# Patient Record
Sex: MALE | Race: WHITE | Employment: OTHER | URBAN - METROPOLITAN AREA
[De-identification: names, ages, dates, MRNs, and addresses within clinical notes are randomized per-mention and may not be internally consistent; named-entity substitution may affect disease eponyms.]

---

## 2017-06-16 ENCOUNTER — APPOINTMENT (OUTPATIENT)
Dept: GENERAL RADIOLOGY | Age: 82
End: 2017-06-16
Attending: EMERGENCY MEDICINE
Payer: MEDICARE

## 2017-06-16 ENCOUNTER — HOSPITAL ENCOUNTER (EMERGENCY)
Age: 82
Discharge: HOME | End: 2017-06-16
Attending: EMERGENCY MEDICINE
Payer: MEDICARE

## 2017-06-16 VITALS
HEIGHT: 72 IN | RESPIRATION RATE: 18 BRPM | OXYGEN SATURATION: 98 % | TEMPERATURE: 98.6 F | WEIGHT: 170 LBS | SYSTOLIC BLOOD PRESSURE: 158 MMHG | DIASTOLIC BLOOD PRESSURE: 84 MMHG | HEART RATE: 60 BPM | BODY MASS INDEX: 23.03 KG/M2

## 2017-06-16 DIAGNOSIS — S92.422A: Primary | ICD-10-CM

## 2017-06-16 PROCEDURE — 99283 EMERGENCY DEPT VISIT LOW MDM: CPT | Performed by: EMERGENCY MEDICINE

## 2017-06-16 PROCEDURE — 75810000293 HC SIMP/SUPERF WND  RPR: Performed by: EMERGENCY MEDICINE

## 2017-06-16 PROCEDURE — 77030010507 HC ADH SKN DERMBND J&J -B

## 2017-06-16 PROCEDURE — 73630 X-RAY EXAM OF FOOT: CPT

## 2017-06-16 RX ORDER — CEPHALEXIN 500 MG/1
500 CAPSULE ORAL 3 TIMES DAILY
Qty: 15 CAP | Refills: 0 | Status: SHIPPED | OUTPATIENT
Start: 2017-06-16 | End: 2017-06-21

## 2017-06-16 RX ORDER — HYDROCODONE BITARTRATE AND ACETAMINOPHEN 5; 325 MG/1; MG/1
1 TABLET ORAL
Qty: 8 TAB | Refills: 0 | Status: SHIPPED | OUTPATIENT
Start: 2017-06-16

## 2017-06-16 NOTE — ED PROVIDER NOTES
HPI Comments: Carrying a dresser and he dropped it several inches onto hisleft great and second toe. Some bleeding adjacent to this. Up-to-date on tetanus. No other injury. Patient is a 80 y.o. male presenting with foot injury. The history is provided by the patient and a relative. Foot Injury    This is a new problem. The problem occurs constantly. The problem has not changed since onset. The pain is present in the left toes. The pain is at a severity of 4/10. Pertinent negatives include no numbness, no stiffness and no tingling. The symptoms are aggravated by palpation. He has tried nothing for the symptoms. There has been a history of trauma. No past medical history on file. No past surgical history on file. No family history on file. Social History     Social History    Marital status:      Spouse name: N/A    Number of children: N/A    Years of education: N/A     Occupational History    Not on file. Social History Main Topics    Smoking status: Not on file    Smokeless tobacco: Not on file    Alcohol use Not on file    Drug use: Not on file    Sexual activity: Not on file     Other Topics Concern    Not on file     Social History Narrative         ALLERGIES: Review of patient's allergies indicates no known allergies. Review of Systems   Musculoskeletal: Negative for stiffness. Skin: Positive for wound. Neurological: Negative for tingling and numbness. All other systems reviewed and are negative. Vitals:    06/16/17 1059   BP: 158/84   Pulse: 60   Resp: 18   Temp: 98.6 °F (37 °C)   SpO2: 98%   Weight: 77.1 kg (170 lb)   Height: 6' (1.829 m)            Physical Exam   Constitutional: He appears well-developed and well-nourished. No distress. HENT:   Head: Atraumatic. Eyes: No scleral icterus. Neck: Neck supple. Cardiovascular: Normal rate. Pulmonary/Chest: Effort normal. No respiratory distress. Musculoskeletal: He exhibits tenderness.  He exhibits no deformity. Left foot: There is laceration. There is normal capillary refill. Feet:    Neurological: He is alert. He exhibits normal muscle tone. Skin: Skin is warm and dry. Psychiatric: His behavior is normal. Thought content normal.   Nursing note and vitals reviewed. MDM  Number of Diagnoses or Management Options  Closed fracture of distal phalanx of left great toe, physeal involvement unspecified, initial encounter:   Diagnosis management comments: fx of great and second toe  Small skin tears and do not feel this is open fx       Amount and/or Complexity of Data Reviewed  Tests in the radiology section of CPT®: reviewed and ordered  Obtain history from someone other than the patient: yes  Independent visualization of images, tracings, or specimens: yes    Risk of Complications, Morbidity, and/or Mortality  Presenting problems: moderate  Diagnostic procedures: low  Management options: moderate    Patient Progress  Patient progress: stable    ED Course       Wound Closure by Adhesive  Date/Time: 6/16/2017 12:31 PM  Performed by: Ardie Spurling  Authorized by: Ardie Spurling     Consent:     Consent obtained:  Verbal    Consent given by:  Patient    Risks discussed:  Infection    Alternatives discussed:  No treatment  Anesthesia (see MAR for exact dosages): Anesthesia method:  None  Laceration details:     Location:  Toe    Toe location:  L big toe    Length (cm):  0.4 (x 2)  Repair type:     Repair type:  Simple  Exploration:     Hemostasis achieved with:  Direct pressure    Contaminated: no    Treatment:     Area cleansed with:  Shchantal-Clens    Amount of cleaning:  Standard    Foreign body removal: nno foreign body.     Skin repair:     Repair method:  Tissue adhesive  Approximation:     Approximation:  Close    Vermilion border: well-aligned    Post-procedure details:     Dressing:  Sterile dressing

## 2017-06-16 NOTE — ED TRIAGE NOTES
Patient arrives to the ER via POV. Patient states earlier today he dropped a cabinet on his left foot from one foot above ground. Patient states he had his tetanus was last month.

## 2017-07-07 ENCOUNTER — HOSPITAL ENCOUNTER (EMERGENCY)
Age: 82
Discharge: HOME | End: 2017-07-07
Attending: EMERGENCY MEDICINE
Payer: MEDICARE

## 2017-07-07 ENCOUNTER — APPOINTMENT (OUTPATIENT)
Dept: GENERAL RADIOLOGY | Age: 82
End: 2017-07-07
Attending: EMERGENCY MEDICINE
Payer: MEDICARE

## 2017-07-07 VITALS
HEART RATE: 65 BPM | BODY MASS INDEX: 23.7 KG/M2 | DIASTOLIC BLOOD PRESSURE: 83 MMHG | SYSTOLIC BLOOD PRESSURE: 141 MMHG | HEIGHT: 72 IN | RESPIRATION RATE: 18 BRPM | WEIGHT: 175 LBS | OXYGEN SATURATION: 100 % | TEMPERATURE: 97.7 F

## 2017-07-07 DIAGNOSIS — L03.032 CELLULITIS OF GREAT TOE, LEFT: Primary | ICD-10-CM

## 2017-07-07 LAB
BASOPHILS # BLD AUTO: 0 K/UL (ref 0–0.2)
BASOPHILS # BLD: 1 % (ref 0–2)
CRP SERPL-MCNC: <0.3 MG/DL (ref 0–0.9)
DIFFERENTIAL METHOD BLD: ABNORMAL
EOSINOPHIL # BLD: 0.6 K/UL (ref 0–0.8)
EOSINOPHIL NFR BLD: 7 % (ref 0.5–7.8)
ERYTHROCYTE [DISTWIDTH] IN BLOOD BY AUTOMATED COUNT: 13.2 % (ref 11.9–14.6)
ERYTHROCYTE [SEDIMENTATION RATE] IN BLOOD: 17 MM/HR (ref 0–20)
HCT VFR BLD AUTO: 44 % (ref 41.1–50.3)
HGB BLD-MCNC: 15.6 G/DL (ref 13.6–17.2)
IMM GRANULOCYTES # BLD: 0 K/UL (ref 0–0.5)
IMM GRANULOCYTES NFR BLD AUTO: 0.1 % (ref 0–5)
LYMPHOCYTES # BLD AUTO: 27 % (ref 13–44)
LYMPHOCYTES # BLD: 2.3 K/UL (ref 0.5–4.6)
MCH RBC QN AUTO: 31.5 PG (ref 26.1–32.9)
MCHC RBC AUTO-ENTMCNC: 35.5 G/DL (ref 31.4–35)
MCV RBC AUTO: 88.7 FL (ref 79.6–97.8)
MONOCYTES # BLD: 0.5 K/UL (ref 0.1–1.3)
MONOCYTES NFR BLD AUTO: 6 % (ref 4–12)
NEUTS SEG # BLD: 5 K/UL (ref 1.7–8.2)
NEUTS SEG NFR BLD AUTO: 59 % (ref 43–78)
PLATELET # BLD AUTO: 271 K/UL (ref 150–450)
PMV BLD AUTO: 10 FL (ref 10.8–14.1)
RBC # BLD AUTO: 4.96 M/UL (ref 4.23–5.67)
WBC # BLD AUTO: 8.3 K/UL (ref 4.3–11.1)

## 2017-07-07 PROCEDURE — 85652 RBC SED RATE AUTOMATED: CPT | Performed by: EMERGENCY MEDICINE

## 2017-07-07 PROCEDURE — 73660 X-RAY EXAM OF TOE(S): CPT

## 2017-07-07 PROCEDURE — 99283 EMERGENCY DEPT VISIT LOW MDM: CPT | Performed by: EMERGENCY MEDICINE

## 2017-07-07 PROCEDURE — 85025 COMPLETE CBC W/AUTO DIFF WBC: CPT | Performed by: EMERGENCY MEDICINE

## 2017-07-07 PROCEDURE — 86140 C-REACTIVE PROTEIN: CPT | Performed by: EMERGENCY MEDICINE

## 2017-07-07 RX ORDER — SULFAMETHOXAZOLE AND TRIMETHOPRIM 800; 160 MG/1; MG/1
1 TABLET ORAL 2 TIMES DAILY
Qty: 14 TAB | Refills: 0 | Status: SHIPPED | OUTPATIENT
Start: 2017-07-07

## 2017-07-07 NOTE — ED PROVIDER NOTES
HPI Comments: 80-year-old gentleman suffered fractures of left great and second toe 2 weeks ago. Had a laceration of his left toe  At that time. Was feeling well up until yesterday when he noticed redness swelling and drainage. No fever or chills. Patient is a 80 y.o. male presenting with toe pain. The history is provided by the patient. Toe Pain    This is a new problem. The current episode started yesterday. The problem occurs constantly. The problem has not changed since onset. The pain is present in the left toes. The quality of the pain is described as pounding. The pain is mild. Associated symptoms include limited range of motion. Pertinent negatives include no numbness. No past medical history on file. No past surgical history on file. No family history on file. Social History     Social History    Marital status:      Spouse name: N/A    Number of children: N/A    Years of education: N/A     Occupational History    Not on file. Social History Main Topics    Smoking status: Not on file    Smokeless tobacco: Not on file    Alcohol use Not on file    Drug use: Not on file    Sexual activity: Not on file     Other Topics Concern    Not on file     Social History Narrative         ALLERGIES: Review of patient's allergies indicates no known allergies. Review of Systems   Constitutional: Negative for diaphoresis and fever. Neurological: Negative for weakness and numbness. Vitals:    07/07/17 0830   BP: 141/83   Pulse: 65   Resp: 18   Temp: 97.7 °F (36.5 °C)   SpO2: 100%   Weight: 79.4 kg (175 lb)   Height: 6' (1.829 m)            Physical Exam   Constitutional: He appears well-developed and well-nourished. No distress. Musculoskeletal:        Feet:    Nursing note and vitals reviewed. MDM  Number of Diagnoses or Management Options  Diagnosis management comments: Suspect cellulitis but x-ray to rule out osteo-. No evidence for abscess.        Amount and/or Complexity of Data Reviewed  Tests in the radiology section of CPT®: ordered and reviewed    Risk of Complications, Morbidity, and/or Mortality  Presenting problems: low  Diagnostic procedures: minimal  Management options: low    Patient Progress  Patient progress: stable    ED Course       Procedures      Xr Great Toe Lt Min 2 V    Result Date: 7/7/2017  Right great toe  7/7/2017 10:28 AM Indication: Patient dropped heavy object on the great toe several days ago with swelling, erythema and pain. There is a recent prior history of fracture at this toe. Comparison: 6/16/2017. Findings: AP, Lateral, Oblique views are submitted. Bone density is normal. There is continued soft tissue swelling. Again there is a comminuted fracture of the distal phalanx. There is little healing callus. No new fracture, increased malalignment or osseous lysis changes. There is continued fracture change at the second middle phalanx as well. Impression: Essentially stable appearance of fractures at the great toe distal phalanx and second toe middle phalanx. Spoke with orthopedics who recommended some screening blood work and start patient on antibiotics and they will see next week.

## 2017-07-07 NOTE — ED NOTES
Discharge instructions given along with one prescription. Patient and family member understood and had no further questions.

## 2017-07-07 NOTE — ED TRIAGE NOTES
Pt complains of toe pain. Pt has injured his toe and was seen at this facility for same approx 2 weeks ago.

## 2017-07-07 NOTE — DISCHARGE INSTRUCTIONS
Rest.  Elevate your  Foot.  walk only if needed. Call orthopedist office for appointment for recheck next week. Recheck sooner for high fever severe swelling or drainage. Cellulitis: Care Instructions  Your Care Instructions    Cellulitis is a skin infection. It often occurs after a break in the skin from a scrape, cut, bite, or puncture, or after a rash. The doctor has checked you carefully, but problems can develop later. If you notice any problems or new symptoms, get medical treatment right away. Follow-up care is a key part of your treatment and safety. Be sure to make and go to all appointments, and call your doctor if you are having problems. It's also a good idea to know your test results and keep a list of the medicines you take. How can you care for yourself at home? · Take your antibiotics as directed. Do not stop taking them just because you feel better. You need to take the full course of antibiotics. · Prop up the infected area on pillows to reduce pain and swelling. Try to keep the area above the level of your heart as often as you can. · If your doctor told you how to care for your wound, follow your doctor's instructions. If you did not get instructions, follow this general advice:  ¨ Wash the wound with clean water 2 times a day. Don't use hydrogen peroxide or alcohol, which can slow healing. ¨ You may cover the wound with a thin layer of petroleum jelly, such as Vaseline, and a nonstick bandage. ¨ Apply more petroleum jelly and replace the bandage as needed. · Be safe with medicines. Take pain medicines exactly as directed. ¨ If the doctor gave you a prescription medicine for pain, take it as prescribed. ¨ If you are not taking a prescription pain medicine, ask your doctor if you can take an over-the-counter medicine. To prevent cellulitis in the future  · Try to prevent cuts, scrapes, or other injuries to your skin.  Cellulitis most often occurs where there is a break in the skin.  · If you get a scrape, cut, mild burn, or bite, wash the wound with clean water as soon as you can to help avoid infection. Don't use hydrogen peroxide or alcohol, which can slow healing. · If you have swelling in your legs (edema), support stockings and good skin care may help prevent leg sores and cellulitis. · Take care of your feet, especially if you have diabetes or other conditions that increase the risk of infection. Wear shoes and socks. Do not go barefoot. If you have athlete's foot or other skin problems on your feet, talk to your doctor about how to treat them. When should you call for help? Call your doctor now or seek immediate medical care if:  · You have signs that your infection is getting worse, such as:  ¨ Increased pain, swelling, warmth, or redness. ¨ Red streaks leading from the area. ¨ Pus draining from the area. ¨ A fever. · You get a rash. Watch closely for changes in your health, and be sure to contact your doctor if:  · You are not getting better after 1 day (24 hours). · You do not get better as expected. Where can you learn more? Go to http://michele-yudy.info/. Jono De La O in the search box to learn more about \"Cellulitis: Care Instructions. \"  Current as of: October 13, 2016  Content Version: 11.3  © 8464-9019 [employer]. Care instructions adapted under license by [employer] (which disclaims liability or warranty for this information). If you have questions about a medical condition or this instruction, always ask your healthcare professional. [place] [address] any warranty or liability for your use of this information.

## 2023-03-27 ENCOUNTER — TELEPHONE (OUTPATIENT)
Dept: PRIMARY CARE CLINIC | Facility: CLINIC | Age: 88
End: 2023-03-27

## 2023-03-27 DIAGNOSIS — Z51.5 HOSPICE CARE PATIENT: Primary | ICD-10-CM

## 2023-03-27 DIAGNOSIS — F41.9 ANXIETY: ICD-10-CM

## 2023-03-27 DIAGNOSIS — G89.29 OTHER CHRONIC PAIN: ICD-10-CM

## 2023-03-27 RX ORDER — LORAZEPAM 2 MG/ML
1 CONCENTRATE ORAL EVERY 4 HOURS PRN
Qty: 45 ML | Refills: 0 | Status: SHIPPED | OUTPATIENT
Start: 2023-03-27 | End: 2023-04-11

## 2023-03-27 RX ORDER — DIVALPROEX SODIUM 250 MG/1
TABLET, DELAYED RELEASE ORAL
COMMUNITY
Start: 2023-03-25

## 2023-03-27 NOTE — TELEPHONE ENCOUNTER
Call received from [facility] ORTHOPEDIC nurse, Audry Goodpasture. He is having difficulty swallowing. He has anxiety and pain. Will send liquid Ativan and [place] to the pharmacy.